# Patient Record
Sex: FEMALE | Race: WHITE | NOT HISPANIC OR LATINO
[De-identification: names, ages, dates, MRNs, and addresses within clinical notes are randomized per-mention and may not be internally consistent; named-entity substitution may affect disease eponyms.]

---

## 2019-01-16 ENCOUNTER — TRANSCRIPTION ENCOUNTER (OUTPATIENT)
Age: 59
End: 2019-01-16

## 2021-03-10 ENCOUNTER — NON-APPOINTMENT (OUTPATIENT)
Age: 61
End: 2021-03-10

## 2021-03-10 DIAGNOSIS — Z87.891 PERSONAL HISTORY OF NICOTINE DEPENDENCE: ICD-10-CM

## 2021-03-10 DIAGNOSIS — Z82.49 FAMILY HISTORY OF ISCHEMIC HEART DISEASE AND OTHER DISEASES OF THE CIRCULATORY SYSTEM: ICD-10-CM

## 2021-03-10 DIAGNOSIS — Z86.79 PERSONAL HISTORY OF OTHER DISEASES OF THE CIRCULATORY SYSTEM: ICD-10-CM

## 2021-03-10 PROBLEM — Z00.00 ENCOUNTER FOR PREVENTIVE HEALTH EXAMINATION: Status: ACTIVE | Noted: 2021-03-10

## 2021-03-10 RX ORDER — AZELASTINE HYDROCHLORIDE 137 UG/1
0.1 SPRAY, METERED NASAL
Refills: 0 | Status: ACTIVE | COMMUNITY

## 2021-04-02 ENCOUNTER — APPOINTMENT (OUTPATIENT)
Dept: PULMONOLOGY | Facility: CLINIC | Age: 61
End: 2021-04-02
Payer: COMMERCIAL

## 2021-04-02 VITALS
HEART RATE: 78 BPM | OXYGEN SATURATION: 97 % | WEIGHT: 243 LBS | BODY MASS INDEX: 44.72 KG/M2 | RESPIRATION RATE: 14 BRPM | HEIGHT: 62 IN | SYSTOLIC BLOOD PRESSURE: 124 MMHG | DIASTOLIC BLOOD PRESSURE: 68 MMHG

## 2021-04-02 PROCEDURE — 99213 OFFICE O/P EST LOW 20 MIN: CPT

## 2021-04-02 PROCEDURE — 99072 ADDL SUPL MATRL&STAF TM PHE: CPT

## 2021-04-02 NOTE — HISTORY OF PRESENT ILLNESS
[Doing Well] : doing well [Wheezing] : denies wheezing [Cough] : denies coughing [Chest Tightness Or Heavy Pressure] : denies chest tightness [Shortness Of Breath] : denies shortness of breath [Cough at Night] : not awakened at night with cough [Wheezing at Night] : not awakened at night because of wheezing or trouble breathing [More Frequent Use Needed Recently] : normal [None] : The patient is not currently on any medications for ~his/her~ asthma [Goals--Doing Well] : the patient is doing well with ~his/her~ asthma goals

## 2021-04-02 NOTE — PHYSICAL EXAM
[No Acute Distress] : no acute distress [Normal Oropharynx] : normal oropharynx [Normal Appearance] : normal appearance [No Neck Mass] : no neck mass [Normal Rate/Rhythm] : normal rate/rhythm [Normal S1, S2] : normal s1, s2 [No Murmurs] : no murmurs [No Resp Distress] : no resp distress [Clear to Auscultation Bilaterally] : clear to auscultation bilaterally [No Abnormalities] : no abnormalities [Normal Gait] : normal gait [No Clubbing] : no clubbing [No Cyanosis] : no cyanosis [No Edema] : no edema [FROM] : FROM [No Focal Deficits] : no focal deficits [Oriented x3] : oriented x3 [Normal Affect] : normal affect

## 2021-08-13 ENCOUNTER — OUTPATIENT (OUTPATIENT)
Dept: OUTPATIENT SERVICES | Facility: HOSPITAL | Age: 61
LOS: 1 days | Discharge: HOME | End: 2021-08-13

## 2021-08-13 ENCOUNTER — LABORATORY RESULT (OUTPATIENT)
Age: 61
End: 2021-08-13

## 2021-08-13 DIAGNOSIS — Z11.59 ENCOUNTER FOR SCREENING FOR OTHER VIRAL DISEASES: ICD-10-CM

## 2021-08-16 ENCOUNTER — APPOINTMENT (OUTPATIENT)
Age: 61
End: 2021-08-16
Payer: COMMERCIAL

## 2021-08-16 PROCEDURE — 94727 GAS DIL/WSHOT DETER LNG VOL: CPT

## 2021-08-16 PROCEDURE — 94010 BREATHING CAPACITY TEST: CPT

## 2021-08-16 PROCEDURE — 94729 DIFFUSING CAPACITY: CPT

## 2021-08-31 ENCOUNTER — APPOINTMENT (OUTPATIENT)
Age: 61
End: 2021-08-31
Payer: COMMERCIAL

## 2021-08-31 VITALS
SYSTOLIC BLOOD PRESSURE: 130 MMHG | HEIGHT: 62 IN | RESPIRATION RATE: 14 BRPM | WEIGHT: 239 LBS | DIASTOLIC BLOOD PRESSURE: 80 MMHG | OXYGEN SATURATION: 98 % | BODY MASS INDEX: 43.98 KG/M2 | HEART RATE: 78 BPM

## 2021-08-31 PROCEDURE — 99213 OFFICE O/P EST LOW 20 MIN: CPT

## 2021-09-03 NOTE — HISTORY OF PRESENT ILLNESS
[Intermittent] : intermittent [Doing Well] : doing well [Cough] : denies coughing [Wheezing] : denies wheezing [Shortness Of Breath] : denies shortness of breath [Chest Tightness Or Heavy Pressure] : denies chest tightness [Feelings Of Weakness On Exertion] : denies reduced exercise tolerance [Adherent] : the patient is adherent with ~his/her~ medication regimen [Goals--Doing Well] : the patient is doing well with ~his/her~ asthma goals [Cough at Night] : not awakened at night with cough [Wheezing at Night] : not awakened at night because of wheezing or trouble breathing [Initial Evaluation - Existing Diagnosis] : an initial evaluation of an existing diagnosis of [Snoring] : snoring [Unrefreshing Sleep] : unrefreshing sleep [Sleepy When Sedentary] : sleepy when sedentary [Irritability] : irritability [Shortness of Breath] : shortness of breath [None] : The patient is not currently being treated for this problem

## 2021-10-06 ENCOUNTER — OUTPATIENT (OUTPATIENT)
Dept: OUTPATIENT SERVICES | Facility: HOSPITAL | Age: 61
LOS: 1 days | Discharge: HOME | End: 2021-10-06
Payer: COMMERCIAL

## 2021-10-06 PROCEDURE — 95810 POLYSOM 6/> YRS 4/> PARAM: CPT | Mod: 26

## 2021-10-07 DIAGNOSIS — G47.33 OBSTRUCTIVE SLEEP APNEA (ADULT) (PEDIATRIC): ICD-10-CM

## 2021-10-20 ENCOUNTER — APPOINTMENT (OUTPATIENT)
Age: 61
End: 2021-10-20
Payer: COMMERCIAL

## 2021-10-20 PROCEDURE — 99441: CPT | Mod: 95

## 2021-10-20 NOTE — HISTORY OF PRESENT ILLNESS
[Home] : at home, [unfilled] , at the time of the visit. [Medical Office: ([facility])___] : at the medical office located in

## 2022-02-07 ENCOUNTER — APPOINTMENT (OUTPATIENT)
Age: 62
End: 2022-02-07

## 2022-04-27 ENCOUNTER — APPOINTMENT (OUTPATIENT)
Age: 62
End: 2022-04-27

## 2022-10-10 ENCOUNTER — APPOINTMENT (OUTPATIENT)
Age: 62
End: 2022-10-10

## 2022-10-10 VITALS
BODY MASS INDEX: 44.16 KG/M2 | OXYGEN SATURATION: 97 % | DIASTOLIC BLOOD PRESSURE: 80 MMHG | HEIGHT: 62 IN | RESPIRATION RATE: 16 BRPM | SYSTOLIC BLOOD PRESSURE: 144 MMHG | HEART RATE: 92 BPM | WEIGHT: 240 LBS

## 2022-10-10 PROCEDURE — 99213 OFFICE O/P EST LOW 20 MIN: CPT

## 2022-10-10 NOTE — PHYSICAL EXAM
[No Acute Distress] : no acute distress [Normal Oropharynx] : normal oropharynx [Normal Appearance] : normal appearance [Normal Rate/Rhythm] : normal rate/rhythm [Normal S1, S2] : normal s1, s2 [No Resp Distress] : no resp distress [Clear to Auscultation Bilaterally] : clear to auscultation bilaterally [No Focal Deficits] : no focal deficits [Oriented x3] : oriented x3 [TextBox_2] : obese

## 2022-10-10 NOTE — END OF VISIT
[] : Fellow [FreeTextEntry3] : patient seen and examined agree above note \par ct scan ordered for follow up if stable no need to repeat quit smoking more then 20 years \par apap machine \par patient was counseled to use the machine every night at least 4 hrs \par also counseled for weight loss and exercise\par

## 2022-10-25 ENCOUNTER — TRANSCRIPTION ENCOUNTER (OUTPATIENT)
Age: 62
End: 2022-10-25

## 2022-11-15 ENCOUNTER — APPOINTMENT (OUTPATIENT)
Dept: ORTHOPEDIC SURGERY | Facility: CLINIC | Age: 62
End: 2022-11-15

## 2022-11-15 ENCOUNTER — NON-APPOINTMENT (OUTPATIENT)
Age: 62
End: 2022-11-15

## 2022-11-15 PROCEDURE — 99213 OFFICE O/P EST LOW 20 MIN: CPT

## 2022-11-15 NOTE — HISTORY OF PRESENT ILLNESS
[de-identified] : b/l knee pain\par OA in bilateral knee\par pain with activity\par localized to knee joint\par \par There is an effusion, clinically varus alignment, tenderness around the joint line, and a positive patella grind test.  ROM is 3-110 degrees and there is significant guarding throughout the arc of motion.  Mild quadriceps atrophy. Varus thrust is present with stress, no gross is instability noted.\par \par x-rays demonstrate severe OA\par \par f/u as needed

## 2023-01-31 ENCOUNTER — APPOINTMENT (OUTPATIENT)
Age: 63
End: 2023-01-31
Payer: COMMERCIAL

## 2023-01-31 VITALS
HEART RATE: 100 BPM | SYSTOLIC BLOOD PRESSURE: 118 MMHG | RESPIRATION RATE: 16 BRPM | HEIGHT: 62 IN | OXYGEN SATURATION: 95 % | DIASTOLIC BLOOD PRESSURE: 80 MMHG

## 2023-01-31 PROCEDURE — 99213 OFFICE O/P EST LOW 20 MIN: CPT

## 2023-01-31 NOTE — HISTORY OF PRESENT ILLNESS
[TextBox_4] : She received the APAP machine and she has been using it for almost 1 months and happy with that getting benefit the compliance data was reviewed was excellent and her average AHI is around 1.5 and mean pressure she needs around 12\par No cough wheezing or shortness of breath\par CT scan done in October 24 was reviewed stable nodule from  2019\par Pending PFT

## 2023-03-15 ENCOUNTER — APPOINTMENT (OUTPATIENT)
Dept: ORTHOPEDIC SURGERY | Facility: CLINIC | Age: 63
End: 2023-03-15

## 2023-04-11 ENCOUNTER — APPOINTMENT (OUTPATIENT)
Age: 63
End: 2023-04-11
Payer: COMMERCIAL

## 2023-04-11 ENCOUNTER — APPOINTMENT (OUTPATIENT)
Dept: PULMONOLOGY | Facility: CLINIC | Age: 63
End: 2023-04-11
Payer: COMMERCIAL

## 2023-04-11 PROCEDURE — 94729 DIFFUSING CAPACITY: CPT

## 2023-04-11 PROCEDURE — 94727 GAS DIL/WSHOT DETER LNG VOL: CPT

## 2023-04-11 PROCEDURE — 94010 BREATHING CAPACITY TEST: CPT

## 2023-05-02 ENCOUNTER — APPOINTMENT (OUTPATIENT)
Dept: ORTHOPEDIC SURGERY | Facility: CLINIC | Age: 63
End: 2023-05-02
Payer: COMMERCIAL

## 2023-05-02 DIAGNOSIS — M77.01 MEDIAL EPICONDYLITIS, RIGHT ELBOW: ICD-10-CM

## 2023-05-02 PROCEDURE — 99214 OFFICE O/P EST MOD 30 MIN: CPT

## 2023-05-02 NOTE — ASSESSMENT
[FreeTextEntry1] : Comes in with complaints of right elbow pain.  Is been going on for about 3 months.  She says she did not have any specific injury.  She has a history of bilateral knee arthritis.  She states that she uses the railing to help her up the stairs and down the stairs.\par \par full active range of motion of the right shoulder, wrist and fingers\par full active range of motion of the right elbow\par Tenderness to palpation of the medial epicondyle and proximal flexor pronator\par pain with resisted wrist flexion and forearm pronation\par 4/5 strength in pronation, otherwise 5/5 strength\par median/ulnar/radial sensation intact to light touch\par ain/pin/ulnar motor intact\par palpable pulses CR<2s\par \par The patient was advised of the diagnosis. The natural history of the pathology was explained in full to the patient in layman's terms. All questions were answered. The risks and benefits of surgical and non-surgical treatment alternatives were explained in full to the patient.\par We discussed treatment options including nsaids, topical gels, tennis elbow strap, PT, activity modification, cortisone inj, sx .pt is aware that symptoms usually resolve on their own in 95-99% of people, but the timeframe is unknown.\par Home exercises/stretches were demonstrated and the patient practiced them as well- They are to do the exercises hourly and hold the stretch for 30 seconds. \par Avoid repetitive wrist flexion\par time was spent instructing the patient on appropriate placement of the elbow strap as well. \par \par I gave the patient handout with instructions on the stretching.  She is going to work on that.  If she has any issues she will return.

## 2023-09-15 ENCOUNTER — APPOINTMENT (OUTPATIENT)
Dept: PULMONOLOGY | Facility: CLINIC | Age: 63
End: 2023-09-15
Payer: COMMERCIAL

## 2023-09-15 VITALS
SYSTOLIC BLOOD PRESSURE: 122 MMHG | HEIGHT: 62 IN | BODY MASS INDEX: 44.16 KG/M2 | WEIGHT: 240 LBS | HEART RATE: 94 BPM | OXYGEN SATURATION: 97 % | DIASTOLIC BLOOD PRESSURE: 78 MMHG

## 2023-09-15 PROCEDURE — 99214 OFFICE O/P EST MOD 30 MIN: CPT

## 2023-09-26 ENCOUNTER — TRANSCRIPTION ENCOUNTER (OUTPATIENT)
Age: 63
End: 2023-09-26

## 2023-09-26 RX ORDER — RAMIPRIL 10 MG/1
10 CAPSULE ORAL
Refills: 0 | Status: COMPLETED | COMMUNITY
End: 2023-09-26

## 2024-05-24 ENCOUNTER — APPOINTMENT (OUTPATIENT)
Dept: PULMONOLOGY | Facility: CLINIC | Age: 64
End: 2024-05-24
Payer: COMMERCIAL

## 2024-05-24 VITALS
HEART RATE: 89 BPM | WEIGHT: 240 LBS | OXYGEN SATURATION: 97 % | SYSTOLIC BLOOD PRESSURE: 130 MMHG | HEIGHT: 62 IN | DIASTOLIC BLOOD PRESSURE: 80 MMHG | BODY MASS INDEX: 44.16 KG/M2

## 2024-05-24 DIAGNOSIS — G47.33 OBSTRUCTIVE SLEEP APNEA (ADULT) (PEDIATRIC): ICD-10-CM

## 2024-05-24 DIAGNOSIS — J45.909 UNSPECIFIED ASTHMA, UNCOMPLICATED: ICD-10-CM

## 2024-05-24 DIAGNOSIS — R91.1 SOLITARY PULMONARY NODULE: ICD-10-CM

## 2024-05-24 PROCEDURE — 99214 OFFICE O/P EST MOD 30 MIN: CPT

## 2024-05-24 RX ORDER — ALBUTEROL SULFATE 90 UG/1
108 (90 BASE) INHALANT RESPIRATORY (INHALATION)
Qty: 1 | Refills: 2 | Status: ACTIVE | COMMUNITY
Start: 2024-05-24 | End: 1900-01-01

## 2024-05-24 NOTE — PLAN
[TextEntry] : -intermittent asthma  albuterol as needed  -lung nodule ground glass  ct scan ordered  -martha  will proceed with sleep study  counseled for weight reduction  told not to drive if feel tired  counseled for sleep hygiene

## 2024-05-24 NOTE — HISTORY OF PRESENT ILLNESS
[TextBox_4] : Patient coming for follow-up said been using the machine every night and getting benefit compliance report reviewed excellent CT scan from 2022 reviewed was stable groundglass nodule is due to repeat now prescription given to the patient Her asthma been controlled not requiring albuterol she only used it couple of months ago when she got like a cold-like symptom currently no cough wheezing or shortness of breath

## 2024-06-11 ENCOUNTER — APPOINTMENT (OUTPATIENT)
Dept: PULMONOLOGY | Facility: CLINIC | Age: 64
End: 2024-06-11
Payer: COMMERCIAL

## 2024-06-11 PROCEDURE — 94729 DIFFUSING CAPACITY: CPT

## 2024-06-11 PROCEDURE — 94727 GAS DIL/WSHOT DETER LNG VOL: CPT

## 2024-06-11 PROCEDURE — 94010 BREATHING CAPACITY TEST: CPT

## 2024-07-19 ENCOUNTER — APPOINTMENT (OUTPATIENT)
Dept: ORTHOPEDIC SURGERY | Facility: CLINIC | Age: 64
End: 2024-07-19
Payer: COMMERCIAL

## 2024-07-19 DIAGNOSIS — M17.0 BILATERAL PRIMARY OSTEOARTHRITIS OF KNEE: ICD-10-CM

## 2024-07-19 PROCEDURE — 73562 X-RAY EXAM OF KNEE 3: CPT | Mod: 50

## 2024-07-19 PROCEDURE — 99213 OFFICE O/P EST LOW 20 MIN: CPT | Mod: 25

## 2024-07-19 PROCEDURE — 20610 DRAIN/INJ JOINT/BURSA W/O US: CPT | Mod: 50

## 2024-09-05 ENCOUNTER — APPOINTMENT (OUTPATIENT)
Dept: ORTHOPEDIC SURGERY | Facility: CLINIC | Age: 64
End: 2024-09-05
Payer: COMMERCIAL

## 2024-09-05 ENCOUNTER — NON-APPOINTMENT (OUTPATIENT)
Age: 64
End: 2024-09-05

## 2024-09-05 VITALS — WEIGHT: 240 LBS | HEIGHT: 62 IN | BODY MASS INDEX: 44.16 KG/M2

## 2024-09-05 DIAGNOSIS — M17.12 UNILATERAL PRIMARY OSTEOARTHRITIS, LEFT KNEE: ICD-10-CM

## 2024-09-05 PROCEDURE — 73562 X-RAY EXAM OF KNEE 3: CPT | Mod: LT

## 2024-09-05 PROCEDURE — 99213 OFFICE O/P EST LOW 20 MIN: CPT

## 2024-09-05 RX ORDER — LOSARTAN POTASSIUM AND HYDROCHLOROTHIAZIDE 12.5; 5 MG/1; MG/1
50-12.5 TABLET ORAL
Refills: 0 | Status: ACTIVE | COMMUNITY

## 2024-09-05 NOTE — DISCUSSION/SUMMARY
[de-identified] : Chief complaint: Left knee pain  HPI: Patient is a 64-year-old female presents the office today for the evaluation of pain to the left knee.  She reports that she has chronic pain to the left knee however this morning upon getting out of the shower she twisted the left knee.  She experienced an acute exacerbation of the left knee pain which has gotten worse since going to work where she stood on her feet for an extended period of time.  She localizes the pain to the diffuse knee.  Denies taking any medication for relief of her discomfort.  She does not typically take NSAIDs secondary to history of IBS.  Denies any fall or trauma.  ROS: Positive for left knee pain  Physical examination of the left knee shows: No erythema  No ecchymosis  Tenderness to palpation over medial joint line, lateral joint line No appreciable effusion Able to perform active straight leg raise Knee flexion from 0-90 degrees Light touch is intact throughout Nonantalgic gait equivocal Jesse's laterally  negative Anterior Drawer positive Patellofemoral crepitus No appreciable Instability with varus / valgus stress testing Calves are soft and nontender  Three-view x-rays of the left knee performed the office today show no obvious acute displaced fracture, subluxation, or dislocation, advanced degenerative changes most significant in the medial and patellofemoral compartments  Assessment/plan: Arthritis of the left knee, high degree of suspicion for an exacerbation of arthritis, discussed ongoing treatment options with the patient, patient does not take NSAIDs secondary to a history of IBS, she can continue to take over-the-counter Tylenol on an as needed basis for pain, patient got a corticosteroid injection in July 2024, she is not eligible for repeat corticosteroid injection today, patient was provided with a prescription for formal physical therapy so that she can get started on that, she can elevate the left lower extremity and apply ice to the left knee on an as-needed basis with sensory precautions, patient already has a follow-up appointment established with Dr. Lopez for possible surgical consultation given her advanced osteoarthritis, she can keep this appointment as scheduled, patient verbalized understanding of all findings in the office today, she agrees to follow-up as directed

## 2024-09-10 ENCOUNTER — NON-APPOINTMENT (OUTPATIENT)
Age: 64
End: 2024-09-10

## 2024-10-22 ENCOUNTER — APPOINTMENT (OUTPATIENT)
Dept: ORTHOPEDIC SURGERY | Facility: CLINIC | Age: 64
End: 2024-10-22
Payer: COMMERCIAL

## 2024-10-22 ENCOUNTER — NON-APPOINTMENT (OUTPATIENT)
Age: 64
End: 2024-10-22

## 2024-10-22 DIAGNOSIS — M17.0 BILATERAL PRIMARY OSTEOARTHRITIS OF KNEE: ICD-10-CM

## 2024-10-22 PROCEDURE — 99213 OFFICE O/P EST LOW 20 MIN: CPT

## 2024-12-06 ENCOUNTER — APPOINTMENT (OUTPATIENT)
Dept: ORTHOPEDIC SURGERY | Facility: CLINIC | Age: 64
End: 2024-12-06
Payer: COMMERCIAL

## 2024-12-06 DIAGNOSIS — R22.31 LOCALIZED SWELLING, MASS AND LUMP, RIGHT UPPER LIMB: ICD-10-CM

## 2024-12-06 PROCEDURE — 73140 X-RAY EXAM OF FINGER(S): CPT | Mod: RT

## 2024-12-06 PROCEDURE — 99213 OFFICE O/P EST LOW 20 MIN: CPT

## 2025-01-08 ENCOUNTER — APPOINTMENT (OUTPATIENT)
Dept: ORTHOPEDIC SURGERY | Facility: CLINIC | Age: 65
End: 2025-01-08
Payer: COMMERCIAL

## 2025-01-08 DIAGNOSIS — R22.31 LOCALIZED SWELLING, MASS AND LUMP, RIGHT UPPER LIMB: ICD-10-CM

## 2025-01-08 PROCEDURE — 99214 OFFICE O/P EST MOD 30 MIN: CPT

## 2025-01-09 ENCOUNTER — APPOINTMENT (OUTPATIENT)
Facility: CLINIC | Age: 65
End: 2025-01-09

## 2025-02-28 ENCOUNTER — APPOINTMENT (OUTPATIENT)
Dept: PULMONOLOGY | Facility: CLINIC | Age: 65
End: 2025-02-28
Payer: COMMERCIAL

## 2025-02-28 VITALS — HEART RATE: 82 BPM | BODY MASS INDEX: 43.9 KG/M2 | WEIGHT: 240 LBS | OXYGEN SATURATION: 97 % | RESPIRATION RATE: 14 BRPM

## 2025-02-28 DIAGNOSIS — J45.909 UNSPECIFIED ASTHMA, UNCOMPLICATED: ICD-10-CM

## 2025-02-28 DIAGNOSIS — Z01.818 ENCOUNTER FOR OTHER PREPROCEDURAL EXAMINATION: ICD-10-CM

## 2025-02-28 DIAGNOSIS — R91.1 SOLITARY PULMONARY NODULE: ICD-10-CM

## 2025-02-28 PROCEDURE — G2211 COMPLEX E/M VISIT ADD ON: CPT | Mod: NC

## 2025-02-28 PROCEDURE — 99214 OFFICE O/P EST MOD 30 MIN: CPT

## 2025-07-14 ENCOUNTER — APPOINTMENT (OUTPATIENT)
Dept: PULMONOLOGY | Facility: CLINIC | Age: 65
End: 2025-07-14
Payer: COMMERCIAL

## 2025-07-14 VITALS
HEART RATE: 95 BPM | OXYGEN SATURATION: 98 % | WEIGHT: 240 LBS | DIASTOLIC BLOOD PRESSURE: 90 MMHG | HEIGHT: 62 IN | BODY MASS INDEX: 44.16 KG/M2 | RESPIRATION RATE: 16 BRPM | SYSTOLIC BLOOD PRESSURE: 150 MMHG

## 2025-07-14 PROCEDURE — G2211 COMPLEX E/M VISIT ADD ON: CPT | Mod: NC

## 2025-07-14 PROCEDURE — 99214 OFFICE O/P EST MOD 30 MIN: CPT

## 2025-07-14 RX ORDER — ALBUTEROL SULFATE 90 UG/1
108 (90 BASE) INHALANT RESPIRATORY (INHALATION)
Qty: 1 | Refills: 2 | Status: ACTIVE | COMMUNITY
Start: 2025-07-14 | End: 1900-01-01

## 2025-08-27 ENCOUNTER — APPOINTMENT (OUTPATIENT)
Dept: PULMONOLOGY | Facility: CLINIC | Age: 65
End: 2025-08-27
Payer: COMMERCIAL

## 2025-08-27 VITALS
SYSTOLIC BLOOD PRESSURE: 140 MMHG | HEIGHT: 62 IN | WEIGHT: 250 LBS | HEART RATE: 102 BPM | BODY MASS INDEX: 46.01 KG/M2 | RESPIRATION RATE: 16 BRPM | DIASTOLIC BLOOD PRESSURE: 90 MMHG | OXYGEN SATURATION: 97 %

## 2025-08-27 DIAGNOSIS — G47.33 OBSTRUCTIVE SLEEP APNEA (ADULT) (PEDIATRIC): ICD-10-CM

## 2025-08-27 DIAGNOSIS — J45.909 UNSPECIFIED ASTHMA, UNCOMPLICATED: ICD-10-CM

## 2025-08-27 PROCEDURE — 94729 DIFFUSING CAPACITY: CPT

## 2025-08-27 PROCEDURE — 99213 OFFICE O/P EST LOW 20 MIN: CPT | Mod: 25

## 2025-08-27 PROCEDURE — 94727 GAS DIL/WSHOT DETER LNG VOL: CPT

## 2025-08-27 PROCEDURE — 94010 BREATHING CAPACITY TEST: CPT
